# Patient Record
Sex: MALE | Race: WHITE | NOT HISPANIC OR LATINO | Employment: FULL TIME | URBAN - METROPOLITAN AREA
[De-identification: names, ages, dates, MRNs, and addresses within clinical notes are randomized per-mention and may not be internally consistent; named-entity substitution may affect disease eponyms.]

---

## 2023-12-01 ENCOUNTER — ANCILLARY PROCEDURE (OUTPATIENT)
Dept: RADIOLOGY | Facility: CLINIC | Age: 43
End: 2023-12-01
Payer: COMMERCIAL

## 2023-12-01 ENCOUNTER — OFFICE VISIT (OUTPATIENT)
Dept: ORTHOPEDIC SURGERY | Facility: CLINIC | Age: 43
End: 2023-12-01
Payer: COMMERCIAL

## 2023-12-01 DIAGNOSIS — M54.2 CERVICAL PAIN: ICD-10-CM

## 2023-12-01 DIAGNOSIS — M54.2 NECK PAIN: Primary | ICD-10-CM

## 2023-12-01 PROCEDURE — 72050 X-RAY EXAM NECK SPINE 4/5VWS: CPT | Performed by: ORTHOPAEDIC SURGERY

## 2023-12-01 PROCEDURE — 72050 X-RAY EXAM NECK SPINE 4/5VWS: CPT | Mod: FY

## 2023-12-01 PROCEDURE — 99214 OFFICE O/P EST MOD 30 MIN: CPT | Performed by: ORTHOPAEDIC SURGERY

## 2023-12-01 PROCEDURE — 99204 OFFICE O/P NEW MOD 45 MIN: CPT | Performed by: ORTHOPAEDIC SURGERY

## 2023-12-01 NOTE — PROGRESS NOTES
Tao Schneider is a 42 y.o. male who presents for Pain of the Neck (Cervical pain).    HPI:  42-year-old gentleman with neck pain presents for evaluation.  He denies any fever chills nausea vomiting night sweats.  He has no bowel or bladder complaints.    Physical exam:  Well-nourished, well kept.    Patient can rise from a seated position, can sit from a standing position. Can get up heels and toes.  Patient is tender in the paraspinal musculature of the cervical spine is range of motion is mildly decreased secondary to some pain and stiffness no weakness no instability to muscle strength. examination of the upper extremities reveals no point tenderness, swelling, or deformity.  Range of motion of the shoulders, elbows, wrists, and fingers are full without crepitance, instability, or exacerbation of pain. Strength is 5/5 throughout. no redness, abrasions, or lesions on the upper extremities bilaterally.  Gross sensation intact to the extremities.  Deep tendon reflexes 2+ and symmetric bilaterally.  Trent negative.  Affect normal.  Alert and oriented X 3.  Coordination normal.    Imaging studies:  AP lateral flexion-extension plain films of the cervical spine were obtained and reviewed today.    Assessment:  New patient.  42-year-old gentleman with a 3-month history of cervical spine pain.  No radicular pain nothing out into the trapezius muscles bilaterally.  This started in September when he woke up with a severe pain in the back of his neck radiating up into the occipital area.  He has done 6 visits of physical therapy and may be 40 to 50% better but the neck pain is just not going away.  He has a history of weight lifting most of his life and usually any sprains typically heal in a week but this is not going away.  He has not had any epidural injections he has not had surgery.  He was sent here by his physical therapist.      In a face-to-face encounter, I performed a history and physical examination,  discussed pertinent diagnostic studies if indicated, and discussed diagnosis and management strategies with both the patient and the midlevel provider.  I reviewed the midlevel's note and agree with the documented findings and plan of care.    Neck pain.  This is an acute problem of uncertain prognosis.  I discussed this case personally with Chavo monahan [facility].  He had physical therapy which is given about 50% relief.  Pain goes up into his occipital region.  No neurologic or radicular symptoms.  No traumatic events.  He is fit but certainly could use some improvement in his diet.  Will get an MRI of his cervical spine and give him information for an anti-inflammatory diet and see him back after the MRI of the cervical spine.  This appears to be a nonsurgical problem.  He is just frustrated he has not gotten complete relief over the past 3 months, which is when this pain started, with physical therapy.

## 2023-12-18 ENCOUNTER — HOSPITAL ENCOUNTER (OUTPATIENT)
Dept: RADIOLOGY | Facility: CLINIC | Age: 43
Discharge: HOME | End: 2023-12-18
Payer: COMMERCIAL

## 2023-12-18 DIAGNOSIS — M54.2 NECK PAIN: ICD-10-CM

## 2023-12-18 DIAGNOSIS — M54.2 CERVICAL PAIN: ICD-10-CM

## 2023-12-18 PROCEDURE — 72141 MRI NECK SPINE W/O DYE: CPT

## 2023-12-18 PROCEDURE — 72141 MRI NECK SPINE W/O DYE: CPT | Performed by: RADIOLOGY

## 2023-12-26 ENCOUNTER — OFFICE VISIT (OUTPATIENT)
Dept: ORTHOPEDIC SURGERY | Facility: CLINIC | Age: 43
End: 2023-12-26
Payer: COMMERCIAL

## 2023-12-26 DIAGNOSIS — G89.29 NECK PAIN, CHRONIC: Primary | ICD-10-CM

## 2023-12-26 DIAGNOSIS — M54.2 NECK PAIN, CHRONIC: Primary | ICD-10-CM

## 2023-12-26 PROCEDURE — 99213 OFFICE O/P EST LOW 20 MIN: CPT | Performed by: ORTHOPAEDIC SURGERY

## 2023-12-26 NOTE — PROGRESS NOTES
Chief complaint neck pain and basocervical pain    HPI: Patient is somewhat improved with physical therapy.  In fact he has been doing some jaw stretching exercises which seem to be helping him quite a bit.  He is doing his exercises on his own at home at this point with physical therapy.  He has read the book to change his diet he is planning on starting that soon.    Physical exam: The neck is good range of motion and is nontender.  He is good good strength in his upper extremities.  He actually worked out this morning.    MRI of the cervical spine does not show any significant abnormalities except some degenerative changes mainly at C5-6 and C6-7 with a little foraminal stenosis at those levels.    Assessment/plan: Neck pain and suboccipital pain.  I recommend continue with physical therapy and work on engaging in that anti-inflammatory diet.  I offered pain management but he says he had enough relief from physical therapy where at this point he does not want to do that.  Give us a call if he ever wants to try that.  He can follow-up with us on a as needed basis.